# Patient Record
Sex: MALE | Race: BLACK OR AFRICAN AMERICAN | Employment: UNEMPLOYED | URBAN - METROPOLITAN AREA
[De-identification: names, ages, dates, MRNs, and addresses within clinical notes are randomized per-mention and may not be internally consistent; named-entity substitution may affect disease eponyms.]

---

## 2018-10-25 ENCOUNTER — HOSPITAL ENCOUNTER (EMERGENCY)
Age: 53
Discharge: HOME | End: 2018-10-25
Attending: EMERGENCY MEDICINE

## 2018-10-25 ENCOUNTER — APPOINTMENT (OUTPATIENT)
Dept: CT IMAGING | Age: 53
End: 2018-10-25

## 2018-10-25 VITALS
TEMPERATURE: 97.8 F | DIASTOLIC BLOOD PRESSURE: 87 MMHG | HEART RATE: 56 BPM | SYSTOLIC BLOOD PRESSURE: 164 MMHG | RESPIRATION RATE: 15 BRPM | OXYGEN SATURATION: 99 %

## 2018-10-25 DIAGNOSIS — G44.229 CHRONIC TENSION-TYPE HEADACHE, NOT INTRACTABLE: Primary | ICD-10-CM

## 2018-10-25 PROCEDURE — 99284 EMERGENCY DEPT VISIT MOD MDM: CPT

## 2018-10-25 PROCEDURE — 6370000000 HC RX 637 (ALT 250 FOR IP): Performed by: EMERGENCY MEDICINE

## 2018-10-25 PROCEDURE — 96375 TX/PRO/DX INJ NEW DRUG ADDON: CPT

## 2018-10-25 PROCEDURE — 70450 CT HEAD/BRAIN W/O DYE: CPT

## 2018-10-25 PROCEDURE — 6360000002 HC RX W HCPCS: Performed by: EMERGENCY MEDICINE

## 2018-10-25 PROCEDURE — 96374 THER/PROPH/DIAG INJ IV PUSH: CPT

## 2018-10-25 PROCEDURE — 2580000003 HC RX 258: Performed by: EMERGENCY MEDICINE

## 2018-10-25 PROCEDURE — 96361 HYDRATE IV INFUSION ADD-ON: CPT

## 2018-10-25 RX ORDER — 0.9 % SODIUM CHLORIDE 0.9 %
1000 INTRAVENOUS SOLUTION INTRAVENOUS ONCE
Status: COMPLETED | OUTPATIENT
Start: 2018-10-25 | End: 2018-10-25

## 2018-10-25 RX ORDER — HYDROCHLOROTHIAZIDE 25 MG/1
25 TABLET ORAL DAILY
Qty: 30 TABLET | Refills: 1 | Status: SHIPPED | OUTPATIENT
Start: 2018-10-25 | End: 2019-02-28

## 2018-10-25 RX ORDER — HYDROCHLOROTHIAZIDE 25 MG/1
25 TABLET ORAL ONCE
Status: COMPLETED | OUTPATIENT
Start: 2018-10-25 | End: 2018-10-25

## 2018-10-25 RX ORDER — DIPHENHYDRAMINE HYDROCHLORIDE 50 MG/ML
25 INJECTION INTRAMUSCULAR; INTRAVENOUS ONCE
Status: COMPLETED | OUTPATIENT
Start: 2018-10-25 | End: 2018-10-25

## 2018-10-25 RX ORDER — ACETAMINOPHEN 325 MG/1
650 TABLET ORAL ONCE
Status: COMPLETED | OUTPATIENT
Start: 2018-10-25 | End: 2018-10-25

## 2018-10-25 RX ADMIN — ACETAMINOPHEN 650 MG: 325 TABLET ORAL at 07:21

## 2018-10-25 RX ADMIN — PROCHLORPERAZINE EDISYLATE 10 MG: 5 INJECTION INTRAMUSCULAR; INTRAVENOUS at 07:21

## 2018-10-25 RX ADMIN — HYDROCHLOROTHIAZIDE 25 MG: 25 TABLET ORAL at 07:21

## 2018-10-25 RX ADMIN — DIPHENHYDRAMINE HYDROCHLORIDE 25 MG: 50 INJECTION INTRAMUSCULAR; INTRAVENOUS at 07:21

## 2018-10-25 RX ADMIN — SODIUM CHLORIDE 1000 ML: 9 INJECTION, SOLUTION INTRAVENOUS at 07:18

## 2018-10-25 ASSESSMENT — PAIN SCALES - GENERAL
PAINLEVEL_OUTOF10: 2
PAINLEVEL_OUTOF10: 10

## 2018-10-25 ASSESSMENT — PAIN DESCRIPTION - LOCATION: LOCATION: HEAD

## 2018-10-25 ASSESSMENT — PAIN DESCRIPTION - DESCRIPTORS: DESCRIPTORS: ACHING

## 2018-10-25 ASSESSMENT — PAIN DESCRIPTION - PAIN TYPE: TYPE: ACUTE PAIN

## 2018-10-25 NOTE — ED PROVIDER NOTES
he uses drugs, including Marijuana and Cocaine. Medications     Current Discharge Medication List      CONTINUE these medications which have NOT CHANGED    Details   tamsulosin (FLOMAX) 0.4 MG capsule Take 1 capsule by mouth daily  Qty: 30 capsule, Refills: 0             Allergies     He has No Known Allergies. Physical Exam     INITIAL VITALS: BP: (!) 196/129, Temp: 97.8 °F (36.6 °C), Pulse: 70, Resp: 24, SpO2: 98 %   Physical Exam  General: Well appearing in NAD  HEENT:  head is atraumatic, sclera are clear, oropharynx is nonerythematous, mucus membranes are moist  Neck: Trachea midline  Chest: Nonlabored respirations, clear to auscultation bilaterally  Cardiovascular: Regular rate and rhythm, 2+ radial pulses bilaterally  Abdominal: Nondistended abdomen, soft, nontender without rebound or gaurding  Skin: Warm, dry well perfused, no rashes  Extremities: no obvious deformities, no tenderness to palpation diffusely  Neurologic: Alert and oriented x4, speech is clear and intact without dysarthria, cranial nerves II-XII are intact, there is 5 out of 5 strength in bilateral upper and lower extremities. There is intact sensation to light touch in bilateral upper and lower extremities. PEERL. EOMI without vertical or horizontal nystagmus. Pupils equal round reactive to light, no nuchal rigidity. Psychologic: appropriate mood and affect  Diagnostic Results     EKG     RADIOLOGY:  CT Head WO Contrast   Final Result      Mild sinus disease. No acute intracranial abnormality. LABS:   No results found for this visit on 10/25/18. ED BEDSIDE ULTRASOUND:    RECENT VITALS:  BP: (!) 164/87,Temp: 97.8 °F (36.6 °C), Pulse: 56, Resp: 15, SpO2: 99 %     Procedures       ED Course     Nursing Notes, Past Medical Hx, Past Surgical Hx, Social Hx,Allergies, and Family Hx were reviewed.     The patient was given the following medications:  Orders Placed This Encounter   Medications    hydrochlorothiazide (HYDRODIURIL) tablet 25 mg    acetaminophen (TYLENOL) tablet 650 mg    prochlorperazine (COMPAZINE) injection 10 mg    diphenhydrAMINE (BENADRYL) injection 25 mg    0.9 % sodium chloride bolus    hydrochlorothiazide (HYDRODIURIL) 25 MG tablet     Sig: Take 1 tablet by mouth daily     Dispense:  30 tablet     Refill:  1       CONSULTS:  None    MEDICAL DECISIONMAKING / ASSESSMENT / PLAN     Fadumo Galicia is a 46 y.o. male who presents with headache. On presentation he was complaining of bitemporal frontal headache. Started 2 hours prior to arrival.  Similar in exact same character as previous headaches that she's presented here for. He has had normal CTA of head and neck without any evidence of aneurysm 8 months ago. CT head at that time was normal as well. He was hypertensive here to the 180s over 110s, but had not taken his blood pressure medication for many months. He did not nonfocal neurologic exam.  Head CT repeated here today well within 6 hours of onset of headache which was within normal limits. Given normal head CT. Headaches like this before low concern for subarachnoid hemorrhage and hit a normal CT a 6-7 months ago. He was given Compazine, Benadryl and Tylenol and had reduction of headache to 2 out of 10 blood pressure returned to lower levels at 164/87 felt much improved walking around the emergency Department in no acute distress with a nonfocal neurologic exam and was discharged in stable condition. Clinical Impression     1.  Chronic tension-type headache, not intractable        Disposition     PATIENT REFERRED TO:  [facility] Emergency Department  [address] [place] [address]  [phone]          DISCHARGE MEDICATIONS:  Current Discharge Medication List          DISPOSITION Decision To Discharge 10/25/2018 08:46:48 AM       Valerie Seay MD  10/25/18 2610

## 2019-02-28 ENCOUNTER — HOSPITAL ENCOUNTER (EMERGENCY)
Age: 54
Discharge: HOME | End: 2019-02-28
Attending: EMERGENCY MEDICINE

## 2019-02-28 ENCOUNTER — APPOINTMENT (OUTPATIENT)
Dept: CT IMAGING | Age: 54
End: 2019-02-28

## 2019-02-28 VITALS
SYSTOLIC BLOOD PRESSURE: 178 MMHG | RESPIRATION RATE: 14 BRPM | BODY MASS INDEX: 27.59 KG/M2 | OXYGEN SATURATION: 98 % | HEART RATE: 74 BPM | TEMPERATURE: 97.9 F | HEIGHT: 74 IN | WEIGHT: 215 LBS | DIASTOLIC BLOOD PRESSURE: 102 MMHG

## 2019-02-28 DIAGNOSIS — I10 HYPERTENSION, UNSPECIFIED TYPE: Primary | ICD-10-CM

## 2019-02-28 DIAGNOSIS — G44.209 ACUTE NON INTRACTABLE TENSION-TYPE HEADACHE: ICD-10-CM

## 2019-02-28 LAB
ANION GAP SERPL CALCULATED.3IONS-SCNC: 18 MMOL/L (ref 3–16)
BUN BLDV-MCNC: 12 MG/DL (ref 7–20)
CALCIUM SERPL-MCNC: 9.5 MG/DL (ref 8.3–10.6)
CHLORIDE BLD-SCNC: 102 MMOL/L (ref 99–110)
CO2: 24 MMOL/L (ref 21–32)
CREAT SERPL-MCNC: 0.9 MG/DL (ref 0.9–1.3)
GFR AFRICAN AMERICAN: >60
GFR NON-AFRICAN AMERICAN: >60
GLUCOSE BLD-MCNC: 187 MG/DL (ref 70–99)
HCT VFR BLD CALC: 44.6 % (ref 40.5–52.5)
HEMOGLOBIN: 15 G/DL (ref 13.5–17.5)
MCH RBC QN AUTO: 31.9 PG (ref 26–34)
MCHC RBC AUTO-ENTMCNC: 33.6 G/DL (ref 31–36)
MCV RBC AUTO: 94.8 FL (ref 80–100)
PDW BLD-RTO: 12.8 % (ref 12.4–15.4)
PLATELET # BLD: 322 K/UL (ref 135–450)
PMV BLD AUTO: 7.7 FL (ref 5–10.5)
POTASSIUM REFLEX MAGNESIUM: 4 MMOL/L (ref 3.5–5.1)
RBC # BLD: 4.71 M/UL (ref 4.2–5.9)
SODIUM BLD-SCNC: 144 MMOL/L (ref 136–145)
TROPONIN: <0.01 NG/ML
WBC # BLD: 6.2 K/UL (ref 4–11)

## 2019-02-28 PROCEDURE — 96374 THER/PROPH/DIAG INJ IV PUSH: CPT

## 2019-02-28 PROCEDURE — 6360000002 HC RX W HCPCS: Performed by: STUDENT IN AN ORGANIZED HEALTH CARE EDUCATION/TRAINING PROGRAM

## 2019-02-28 PROCEDURE — 6370000000 HC RX 637 (ALT 250 FOR IP): Performed by: STUDENT IN AN ORGANIZED HEALTH CARE EDUCATION/TRAINING PROGRAM

## 2019-02-28 PROCEDURE — 2580000003 HC RX 258: Performed by: STUDENT IN AN ORGANIZED HEALTH CARE EDUCATION/TRAINING PROGRAM

## 2019-02-28 PROCEDURE — 80048 BASIC METABOLIC PNL TOTAL CA: CPT

## 2019-02-28 PROCEDURE — 70450 CT HEAD/BRAIN W/O DYE: CPT

## 2019-02-28 PROCEDURE — 99284 EMERGENCY DEPT VISIT MOD MDM: CPT

## 2019-02-28 PROCEDURE — 84484 ASSAY OF TROPONIN QUANT: CPT

## 2019-02-28 PROCEDURE — 96361 HYDRATE IV INFUSION ADD-ON: CPT

## 2019-02-28 PROCEDURE — 85027 COMPLETE CBC AUTOMATED: CPT

## 2019-02-28 PROCEDURE — 93005 ELECTROCARDIOGRAM TRACING: CPT | Performed by: STUDENT IN AN ORGANIZED HEALTH CARE EDUCATION/TRAINING PROGRAM

## 2019-02-28 RX ORDER — DIPHENHYDRAMINE HYDROCHLORIDE 50 MG/ML
25 INJECTION INTRAMUSCULAR; INTRAVENOUS ONCE
Status: DISCONTINUED | OUTPATIENT
Start: 2019-02-28 | End: 2019-02-28 | Stop reason: HOSPADM

## 2019-02-28 RX ORDER — HYDROCHLOROTHIAZIDE 25 MG/1
25 TABLET ORAL ONCE
Status: COMPLETED | OUTPATIENT
Start: 2019-02-28 | End: 2019-02-28

## 2019-02-28 RX ORDER — ACETAMINOPHEN 500 MG
1000 TABLET ORAL ONCE
Status: COMPLETED | OUTPATIENT
Start: 2019-02-28 | End: 2019-02-28

## 2019-02-28 RX ORDER — SODIUM CHLORIDE, SODIUM LACTATE, POTASSIUM CHLORIDE, CALCIUM CHLORIDE 600; 310; 30; 20 MG/100ML; MG/100ML; MG/100ML; MG/100ML
1000 INJECTION, SOLUTION INTRAVENOUS ONCE
Status: COMPLETED | OUTPATIENT
Start: 2019-02-28 | End: 2019-02-28

## 2019-02-28 RX ORDER — HYDROCHLOROTHIAZIDE 25 MG/1
25 TABLET ORAL DAILY
Qty: 30 TABLET | Refills: 1 | Status: ON HOLD | OUTPATIENT
Start: 2019-02-28 | End: 2022-07-27

## 2019-02-28 RX ADMIN — HYDROCHLOROTHIAZIDE 25 MG: 25 TABLET ORAL at 17:52

## 2019-02-28 RX ADMIN — PROCHLORPERAZINE EDISYLATE 10 MG: 5 INJECTION INTRAMUSCULAR; INTRAVENOUS at 18:34

## 2019-02-28 RX ADMIN — ACETAMINOPHEN 1000 MG: 500 TABLET, FILM COATED ORAL at 17:52

## 2019-02-28 RX ADMIN — SODIUM CHLORIDE, POTASSIUM CHLORIDE, SODIUM LACTATE AND CALCIUM CHLORIDE 1000 ML: 600; 310; 30; 20 INJECTION, SOLUTION INTRAVENOUS at 18:34

## 2019-02-28 ASSESSMENT — ENCOUNTER SYMPTOMS
RHINORRHEA: 0
EYE DISCHARGE: 0
SHORTNESS OF BREATH: 0
CHEST TIGHTNESS: 0
NAUSEA: 0
SORE THROAT: 0
ABDOMINAL PAIN: 0
WHEEZING: 0
EYE ITCHING: 0
CONSTIPATION: 0
VOMITING: 0
DIARRHEA: 0
COUGH: 0
CHOKING: 0

## 2019-02-28 ASSESSMENT — PAIN DESCRIPTION - PAIN TYPE: TYPE: ACUTE PAIN

## 2019-02-28 ASSESSMENT — PAIN DESCRIPTION - LOCATION: LOCATION: HEAD

## 2019-02-28 ASSESSMENT — PAIN SCALES - GENERAL: PAINLEVEL_OUTOF10: 10

## 2019-03-01 LAB
EKG ATRIAL RATE: 71 BPM
EKG DIAGNOSIS: NORMAL
EKG P AXIS: 78 DEGREES
EKG P-R INTERVAL: 140 MS
EKG Q-T INTERVAL: 378 MS
EKG QRS DURATION: 94 MS
EKG QTC CALCULATION (BAZETT): 410 MS
EKG R AXIS: 26 DEGREES
EKG T AXIS: -14 DEGREES
EKG VENTRICULAR RATE: 71 BPM

## 2022-07-27 ENCOUNTER — HOSPITAL ENCOUNTER (OUTPATIENT)
Age: 57
Setting detail: OBSERVATION
Discharge: HOME | End: 2022-07-28
Attending: EMERGENCY MEDICINE | Admitting: INTERNAL MEDICINE
Payer: COMMERCIAL

## 2022-07-27 ENCOUNTER — APPOINTMENT (OUTPATIENT)
Dept: GENERAL RADIOLOGY | Age: 57
End: 2022-07-27
Payer: COMMERCIAL

## 2022-07-27 DIAGNOSIS — R07.9 CHEST PAIN, UNSPECIFIED TYPE: Primary | ICD-10-CM

## 2022-07-27 LAB
AMPHETAMINE SCREEN, URINE: ABNORMAL
ANION GAP SERPL CALCULATED.3IONS-SCNC: 9 MMOL/L (ref 3–16)
BARBITURATE SCREEN URINE: ABNORMAL
BASOPHILS ABSOLUTE: 0.1 K/UL (ref 0–0.2)
BASOPHILS RELATIVE PERCENT: 1.3 %
BENZODIAZEPINE SCREEN, URINE: ABNORMAL
BUN BLDV-MCNC: 21 MG/DL (ref 7–20)
CALCIUM SERPL-MCNC: 9.5 MG/DL (ref 8.3–10.6)
CANNABINOID SCREEN URINE: ABNORMAL
CHLORIDE BLD-SCNC: 99 MMOL/L (ref 99–110)
CO2: 24 MMOL/L (ref 21–32)
COCAINE METABOLITE SCREEN URINE: POSITIVE
CREAT SERPL-MCNC: 1.1 MG/DL (ref 0.9–1.3)
EKG ATRIAL RATE: 79 BPM
EKG DIAGNOSIS: NORMAL
EKG P AXIS: 77 DEGREES
EKG P-R INTERVAL: 152 MS
EKG Q-T INTERVAL: 386 MS
EKG QRS DURATION: 92 MS
EKG QTC CALCULATION (BAZETT): 442 MS
EKG R AXIS: 44 DEGREES
EKG T AXIS: -61 DEGREES
EKG VENTRICULAR RATE: 79 BPM
EOSINOPHILS ABSOLUTE: 0.1 K/UL (ref 0–0.6)
EOSINOPHILS RELATIVE PERCENT: 1.5 %
GFR AFRICAN AMERICAN: >60
GFR NON-AFRICAN AMERICAN: >60
GLUCOSE BLD-MCNC: 134 MG/DL (ref 70–99)
HCT VFR BLD CALC: 38.9 % (ref 40.5–52.5)
HEMOGLOBIN: 13.5 G/DL (ref 13.5–17.5)
LYMPHOCYTES ABSOLUTE: 2.1 K/UL (ref 1–5.1)
LYMPHOCYTES RELATIVE PERCENT: 28 %
Lab: ABNORMAL
MCH RBC QN AUTO: 32.2 PG (ref 26–34)
MCHC RBC AUTO-ENTMCNC: 34.7 G/DL (ref 31–36)
MCV RBC AUTO: 92.7 FL (ref 80–100)
METHADONE SCREEN, URINE: ABNORMAL
MONOCYTES ABSOLUTE: 0.5 K/UL (ref 0–1.3)
MONOCYTES RELATIVE PERCENT: 6.9 %
NEUTROPHILS ABSOLUTE: 4.7 K/UL (ref 1.7–7.7)
NEUTROPHILS RELATIVE PERCENT: 62.3 %
OPIATE SCREEN URINE: ABNORMAL
OXYCODONE URINE: ABNORMAL
PDW BLD-RTO: 12.5 % (ref 12.4–15.4)
PH UA: 4
PHENCYCLIDINE SCREEN URINE: ABNORMAL
PLATELET # BLD: 290 K/UL (ref 135–450)
PMV BLD AUTO: 7.5 FL (ref 5–10.5)
POTASSIUM SERPL-SCNC: 3.6 MMOL/L (ref 3.5–5.1)
PRO-BNP: 50 PG/ML (ref 0–124)
PROPOXYPHENE SCREEN: ABNORMAL
RBC # BLD: 4.19 M/UL (ref 4.2–5.9)
SODIUM BLD-SCNC: 132 MMOL/L (ref 136–145)
TROPONIN: <0.01 NG/ML
TROPONIN: <0.01 NG/ML
WBC # BLD: 7.6 K/UL (ref 4–11)

## 2022-07-27 PROCEDURE — 80048 BASIC METABOLIC PNL TOTAL CA: CPT

## 2022-07-27 PROCEDURE — G0378 HOSPITAL OBSERVATION PER HR: HCPCS

## 2022-07-27 PROCEDURE — 2580000003 HC RX 258: Performed by: INTERNAL MEDICINE

## 2022-07-27 PROCEDURE — 6360000002 HC RX W HCPCS: Performed by: INTERNAL MEDICINE

## 2022-07-27 PROCEDURE — 93005 ELECTROCARDIOGRAM TRACING: CPT | Performed by: EMERGENCY MEDICINE

## 2022-07-27 PROCEDURE — 99205 OFFICE O/P NEW HI 60 MIN: CPT | Performed by: INTERNAL MEDICINE

## 2022-07-27 PROCEDURE — 99285 EMERGENCY DEPT VISIT HI MDM: CPT

## 2022-07-27 PROCEDURE — 71046 X-RAY EXAM CHEST 2 VIEWS: CPT

## 2022-07-27 PROCEDURE — 6370000000 HC RX 637 (ALT 250 FOR IP): Performed by: INTERNAL MEDICINE

## 2022-07-27 PROCEDURE — 6370000000 HC RX 637 (ALT 250 FOR IP): Performed by: PHYSICIAN ASSISTANT

## 2022-07-27 PROCEDURE — 80307 DRUG TEST PRSMV CHEM ANLYZR: CPT

## 2022-07-27 PROCEDURE — 83880 ASSAY OF NATRIURETIC PEPTIDE: CPT

## 2022-07-27 PROCEDURE — 96372 THER/PROPH/DIAG INJ SC/IM: CPT

## 2022-07-27 PROCEDURE — 36415 COLL VENOUS BLD VENIPUNCTURE: CPT

## 2022-07-27 PROCEDURE — 83036 HEMOGLOBIN GLYCOSYLATED A1C: CPT

## 2022-07-27 PROCEDURE — 84484 ASSAY OF TROPONIN QUANT: CPT

## 2022-07-27 PROCEDURE — 85025 COMPLETE CBC W/AUTO DIFF WBC: CPT

## 2022-07-27 RX ORDER — AMLODIPINE BESYLATE 10 MG/1
10 TABLET ORAL DAILY
Status: DISCONTINUED | OUTPATIENT
Start: 2022-07-27 | End: 2022-07-28 | Stop reason: HOSPADM

## 2022-07-27 RX ORDER — ONDANSETRON 2 MG/ML
4 INJECTION INTRAMUSCULAR; INTRAVENOUS EVERY 6 HOURS PRN
Status: DISCONTINUED | OUTPATIENT
Start: 2022-07-27 | End: 2022-07-28 | Stop reason: HOSPADM

## 2022-07-27 RX ORDER — CARVEDILOL 6.25 MG/1
3.12 TABLET ORAL 2 TIMES DAILY WITH MEALS
Status: DISCONTINUED | OUTPATIENT
Start: 2022-07-27 | End: 2022-07-27

## 2022-07-27 RX ORDER — SODIUM CHLORIDE 0.9 % (FLUSH) 0.9 %
5-40 SYRINGE (ML) INJECTION EVERY 12 HOURS SCHEDULED
Status: DISCONTINUED | OUTPATIENT
Start: 2022-07-27 | End: 2022-07-28 | Stop reason: HOSPADM

## 2022-07-27 RX ORDER — ATORVASTATIN CALCIUM 40 MG/1
40 TABLET, FILM COATED ORAL NIGHTLY
Status: DISCONTINUED | OUTPATIENT
Start: 2022-07-27 | End: 2022-07-28 | Stop reason: HOSPADM

## 2022-07-27 RX ORDER — ONDANSETRON 4 MG/1
4 TABLET, ORALLY DISINTEGRATING ORAL EVERY 8 HOURS PRN
Status: DISCONTINUED | OUTPATIENT
Start: 2022-07-27 | End: 2022-07-28 | Stop reason: HOSPADM

## 2022-07-27 RX ORDER — ENOXAPARIN SODIUM 100 MG/ML
40 INJECTION SUBCUTANEOUS DAILY
Status: DISCONTINUED | OUTPATIENT
Start: 2022-07-27 | End: 2022-07-27

## 2022-07-27 RX ORDER — ENOXAPARIN SODIUM 150 MG/ML
1 INJECTION SUBCUTANEOUS DAILY
Status: COMPLETED | OUTPATIENT
Start: 2022-07-27 | End: 2022-07-27

## 2022-07-27 RX ORDER — POLYETHYLENE GLYCOL 3350 17 G/17G
17 POWDER, FOR SOLUTION ORAL DAILY PRN
Status: DISCONTINUED | OUTPATIENT
Start: 2022-07-27 | End: 2022-07-28 | Stop reason: HOSPADM

## 2022-07-27 RX ORDER — SODIUM CHLORIDE 9 MG/ML
INJECTION, SOLUTION INTRAVENOUS PRN
Status: DISCONTINUED | OUTPATIENT
Start: 2022-07-27 | End: 2022-07-28 | Stop reason: HOSPADM

## 2022-07-27 RX ORDER — NITROGLYCERIN 0.4 MG/1
0.4 TABLET SUBLINGUAL EVERY 5 MIN PRN
Status: DISCONTINUED | OUTPATIENT
Start: 2022-07-27 | End: 2022-07-28 | Stop reason: HOSPADM

## 2022-07-27 RX ORDER — ACETAMINOPHEN 325 MG/1
650 TABLET ORAL EVERY 6 HOURS PRN
Status: DISCONTINUED | OUTPATIENT
Start: 2022-07-27 | End: 2022-07-28 | Stop reason: HOSPADM

## 2022-07-27 RX ORDER — TAMSULOSIN HYDROCHLORIDE 0.4 MG/1
0.4 CAPSULE ORAL DAILY
Status: DISCONTINUED | OUTPATIENT
Start: 2022-07-27 | End: 2022-07-28 | Stop reason: HOSPADM

## 2022-07-27 RX ORDER — ACETAMINOPHEN 650 MG/1
650 SUPPOSITORY RECTAL EVERY 6 HOURS PRN
Status: DISCONTINUED | OUTPATIENT
Start: 2022-07-27 | End: 2022-07-28 | Stop reason: HOSPADM

## 2022-07-27 RX ORDER — ASPIRIN 81 MG/1
81 TABLET, CHEWABLE ORAL DAILY
Status: DISCONTINUED | OUTPATIENT
Start: 2022-07-28 | End: 2022-07-28 | Stop reason: HOSPADM

## 2022-07-27 RX ORDER — ASPIRIN 325 MG
325 TABLET ORAL ONCE
Status: CANCELLED | OUTPATIENT
Start: 2022-07-27 | End: 2022-07-27

## 2022-07-27 RX ORDER — AMLODIPINE BESYLATE 10 MG/1
10 TABLET ORAL DAILY
COMMUNITY
Start: 2022-06-20

## 2022-07-27 RX ORDER — SODIUM CHLORIDE 0.9 % (FLUSH) 0.9 %
5-40 SYRINGE (ML) INJECTION PRN
Status: DISCONTINUED | OUTPATIENT
Start: 2022-07-27 | End: 2022-07-28 | Stop reason: HOSPADM

## 2022-07-27 RX ADMIN — ENOXAPARIN SODIUM 105 MG: 150 INJECTION SUBCUTANEOUS at 18:24

## 2022-07-27 RX ADMIN — AMLODIPINE BESYLATE 10 MG: 10 TABLET ORAL at 22:15

## 2022-07-27 RX ADMIN — NITROGLYCERIN 0.4 MG: 0.4 TABLET, ORALLY DISINTEGRATING SUBLINGUAL at 12:41

## 2022-07-27 RX ADMIN — ATORVASTATIN CALCIUM 40 MG: 40 TABLET, FILM COATED ORAL at 20:29

## 2022-07-27 RX ADMIN — SODIUM CHLORIDE, PRESERVATIVE FREE 10 ML: 5 INJECTION INTRAVENOUS at 20:30

## 2022-07-27 RX ADMIN — TAMSULOSIN HYDROCHLORIDE 0.4 MG: 0.4 CAPSULE ORAL at 20:29

## 2022-07-27 ASSESSMENT — PAIN - FUNCTIONAL ASSESSMENT
PAIN_FUNCTIONAL_ASSESSMENT: NONE - DENIES PAIN
PAIN_FUNCTIONAL_ASSESSMENT: 0-10

## 2022-07-27 ASSESSMENT — ENCOUNTER SYMPTOMS
VOMITING: 0
NAUSEA: 1
ABDOMINAL PAIN: 0
SHORTNESS OF BREATH: 1
EYE REDNESS: 0

## 2022-07-27 ASSESSMENT — PAIN DESCRIPTION - LOCATION
LOCATION: CHEST
LOCATION: CHEST

## 2022-07-27 ASSESSMENT — PAIN DESCRIPTION - DESCRIPTORS
DESCRIPTORS: SHOOTING;SHARP
DESCRIPTORS: ACHING

## 2022-07-27 ASSESSMENT — HEART SCORE
ECG: 1
ECG: 1

## 2022-07-27 ASSESSMENT — PAIN SCALES - GENERAL
PAINLEVEL_OUTOF10: 5
PAINLEVEL_OUTOF10: 5

## 2022-07-27 ASSESSMENT — PAIN DESCRIPTION - ORIENTATION
ORIENTATION: LEFT
ORIENTATION: LEFT

## 2022-07-27 NOTE — ED NOTES
Cardiology consult at bs. Pt agreeable at this time.  Lunch box given per request.      Yessi Zamora RN  07/27/22 Jamshid Pradhan RN  07/27/22 [id number]

## 2022-07-27 NOTE — ED PROVIDER NOTES
[facility]          PHYSICIAN ASSISTANT NOTE       Date of evaluation: 7/27/2022    Chief Complaint     Chest Pain (C/o chest pain was standing at work and felt a sharp pain on the left side of his chest.)      History of Present Illness     Alberto Zhou is a 61 y.o. male with PMH of HTN, Tobacco Use (1PPD x 40 yrs), Cocaine use (last use 2 days ago) who presents with Chest pain. Patient reports onset of nonradiating sharp left-sided chest discomfort approximately 1 hour prior to arrival while he was smoking at work. 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 He states he was also feeling short of breath and somewhat dizzy. EMS were called. Patient was given aspirin and nitro en route with improvement. He now rates his discomfort 5/10. He denies associated diaphoresis, numbness or tingling, nausea or vomiting, abdominal pain, leg swelling or pain. His last stress test was several years ago. Review of Systems     Review of Systems   Constitutional:  Negative for chills, diaphoresis and fever. HENT:  Negative for congestion. Eyes:  Negative for redness. Respiratory:  Positive for shortness of breath. Cardiovascular:  Positive for chest pain. Gastrointestinal:  Positive for nausea (few days ago). Negative for abdominal pain and vomiting. Genitourinary:  Negative for difficulty urinating. Musculoskeletal:  Negative for gait problem. Skin:  Negative for wound. Neurological:  Positive for dizziness. Negative for weakness and numbness. Psychiatric/Behavioral:  The patient is not nervous/anxious. Past Medical, Surgical, Family, and Social History     He has a past medical history of Drug abuse ([address] Headache, History of gout, Hypertension, and Normal cardiac stress test.  He has a past surgical history that includes Cruciate/collateral ligament repair and Wrist surgery.   His family history includes Heart Disease in his father and mother; High Blood Pressure in his father and mother. He reports that he has been smoking cigarettes. He has a 30.00 pack-year smoking history. He has never used smokeless tobacco. He reports current alcohol use. He reports current drug use. Drugs: Marijuana (Weed) and Cocaine. Medications     Previous Medications    AMLODIPINE (NORVASC) 10 MG TABLET    Take 10 mg by mouth in the morning. HYDROCHLOROTHIAZIDE (HYDRODIURIL) 25 MG TABLET    Take 1 tablet by mouth daily    TAMSULOSIN (FLOMAX) 0.4 MG CAPSULE    Take 1 capsule by mouth daily       Allergies     He has No Known Allergies. Physical Exam     INITIAL VITALS: BP: (!) 142/97,Temp: 98 °F (36.7 °C), Heart Rate: 77, Resp: 18, SpO2: 99 %   Physical Exam  Vitals and nursing note reviewed. Constitutional:       General: He is not in acute distress. HENT:      Head: Normocephalic and atraumatic. Mouth/Throat:      Mouth: Mucous membranes are moist.   Eyes:      Extraocular Movements: Extraocular movements intact. Conjunctiva/sclera: Conjunctivae normal.   Cardiovascular:      Rate and Rhythm: Normal rate and regular rhythm. Pulmonary:      Effort: Pulmonary effort is normal. No respiratory distress. Breath sounds: Normal breath sounds. No wheezing, rhonchi or rales. Abdominal:      General: Bowel sounds are normal. There is no distension. Palpations: Abdomen is soft. Tenderness: There is no abdominal tenderness. There is no guarding or rebound. Musculoskeletal:         General: No tenderness (no calf tenderness). Cervical back: Neck supple. Right lower leg: No edema. Left lower leg: No edema. Skin:     General: Skin is warm and dry. Neurological:      Mental Status: He is alert and oriented to person, place, and time.    Psychiatric:         Mood and Affect: Mood normal.         Behavior: Behavior normal.       Diagnostic Results     EKG   Interpreted in conjunction with emergencydepartment physician Arden Parmar MD  Rhythm: normal sinus   Rate: normal  Axis: normal  Ectopy: none  Conduction: normal  ST Segments: no acute change  T Waves:inversion in  v4, v5, v6, II, III, and aVf  Q Waves: none  Clinical Impression: NSR with new lateral T wave inversions  Comparison:  2/28/2019    RADIOLOGY:  XR CHEST (2 VW)   Final Result      Suspected bibasilar atelectasis                      LABS:   Results for orders placed or performed during the hospital encounter of 07/27/22   BMP   Result Value Ref Range    Sodium 132 (L) 136 - 145 mmol/L    Potassium 3.6 3.5 - 5.1 mmol/L    Chloride 99 99 - 110 mmol/L    CO2 24 21 - 32 mmol/L    Anion Gap 9 3 - 16    Glucose 134 (H) 70 - 99 mg/dL    BUN 21 (H) 7 - 20 mg/dL    Creatinine 1.1 0.9 - 1.3 mg/dL    GFR Non-African American >60 >60    GFR African American >60 >60    Calcium 9.5 8.3 - 10.6 mg/dL   CBC with Auto Differential   Result Value Ref Range    WBC 7.6 4.0 - 11.0 K/uL    RBC 4.19 (L) 4.20 - 5.90 M/uL    Hemoglobin 13.5 13.5 - 17.5 g/dL    Hematocrit 38.9 (L) 40.5 - 52.5 %    MCV 92.7 80.0 - 100.0 fL    MCH 32.2 26.0 - 34.0 pg    MCHC 34.7 31.0 - 36.0 g/dL    RDW 12.5 12.4 - 15.4 %    Platelets 798 785 - 611 K/uL    MPV 7.5 5.0 - 10.5 fL    Neutrophils % 62.3 %    Lymphocytes % 28.0 %    Monocytes % 6.9 %    Eosinophils % 1.5 %    Basophils % 1.3 %    Neutrophils Absolute 4.7 1.7 - 7.7 K/uL    Lymphocytes Absolute 2.1 1.0 - 5.1 K/uL    Monocytes Absolute 0.5 0.0 - 1.3 K/uL    Eosinophils Absolute 0.1 0.0 - 0.6 K/uL    Basophils Absolute 0.1 0.0 - 0.2 K/uL   Troponin   Result Value Ref Range    Troponin <0.01 <0.01 ng/mL   Brain Natriuretic Peptide   Result Value Ref Range    Pro-BNP 50 0 - 124 pg/mL   EKG 12 Lead   Result Value Ref Range    Ventricular Rate 79 BPM    Atrial Rate 79 BPM    P-R Interval 152 ms    QRS Duration 92 ms    Q-T Interval 386 ms    QTc Calculation (Bazett) 442 ms    P Axis 77 degrees    R Axis 44 degrees    T Axis -61 degrees    Diagnosis       EKG performed in ER and to be interpreted by ER physician. Confirmed by MD, ER ([id number]),  Patty Babcock ([id number]) on 7/27/2022 1:30:52 PM       RECENT VITALS:  BP: (!) 143/91, Temp: 98 °F (36.7 °C), Heart Rate: 72,Resp: 16, SpO2: 100 %     Procedures         ED Course     Nursing Notes, Past Medical Hx, Past Surgical Hx, Social Hx, Allergies, and Family Hx were reviewed. The patient was given the followingmedications:  Orders Placed This Encounter   Medications    nitroGLYCERIN (NITROSTAT) SL tablet 0.4 mg       CONSULTS:  IP CONSULT TO HOSPITALIST    MEDICAL DECISION MAKING / ASSESSMENT / Don Scot is a 61 y.o. male with PMH of HTN, Tobacco Use (1PPD x 40 yrs), Cocaine use (last use 2 days ago) who presents with sharp left-sided chest pain without radiation that began while at work. The chest pain is nonexertional.  It was associated with shortness of breath and lightheadedness. Exam revealed a well-appearing 13-year-old male in no acute distress. Normal vitals. Heart rhythm regular. Lungs clear auscultation. No peripheral edema. EKG was remarkable for new lateral T wave inversions from prior EKG. Chest xray revealed suspected bibasilar atelectasis. Laboratory workup remarkable for negative troponin, BNP 50, glucose 134, creatinine 1.1. Patient has a heart score of 4 (given EKG changes, age, risk factors). It has been several years since his last stress test. His story is concerning for possible ACS. Will plan to admit for further cardiac risk stratification. I spoke with the admitting team who is in agreement with plan for admission. Patient and family are in agreement with plan for admission. Patient will be cared for in the ED prior to a bed becoming available upstairs. This patient was also evaluated by the attending physician. All care plans were discussed and agreed upon. Clinical Impression     1.  Chest pain, unspecified type Disposition     DISPOSITION Decision To Admit 07/27/2022 01:19:59 PM        Evangelina Quezada PA-C  07/27/22 1522

## 2022-07-27 NOTE — LETTER
[address] [facility] [address]  Phone: [phone]             July 28, 2022    Patient: Tor Louis   YOB: 1965   Date of Visit: 7/27/2022       To Whom It May Concern:    Tor Louis was seen and treated in our facility  beginning 7/27/2022 until 07/28/2022 . He may return to work 08/03/2022.       Sincerely,       Briana Bartlett RN         Signature:__________________________________

## 2022-07-27 NOTE — ED NOTES
Pt voicing that he needs to go home because he has to go to work. Team notified. Awaiting response. Pt aware.       Etelvina Sanon RN  07/27/22 1255

## 2022-07-27 NOTE — ED PROVIDER NOTES
ED Attending Attestation Note     Date of evaluation: 7/27/2022    This patient was seen by the advance practice provider. I have seen and examined the patient, agree with the workup, evaluation, management and diagnosis. The care plan has been discussed. I have reviewed the ECG and concur with the LUIS DANIEL's interpretation. My assessment reveals a 67-year-old male who presents with chief complaint of chest pain. Patient states he had onset of pain today while he was at work. 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  States he was smoking when the pain started. Patient with history of hypertension, long history of smoking. Pain was relieved with aspirin and nitroglycerin. Patient on reassessment states he still has a little pain but overall feels better. Normal heart and lungs. Charla Oquendo MD  07/27/22 3429

## 2022-07-27 NOTE — PROGRESS NOTES
Patient arrived to room 6327 from ED. Vitals stable, A/Ox4. Ambulates independently with very steady gait. Patient expressed wanting to go outside and. This RN explained to him that he isn't allowed off the unit to smoke and offered to message the MD about a nicotine patch. Patient refused stating he didn't want that and verbalized understanding that he couldn't leave the unit. Skin assessed, no issues. Placed on tele. Call light in reach.

## 2022-07-27 NOTE — H&P
History and Physical    Admit Date: 7/27/2022    Patient's PCP: Dr. Jessica Rivera primary care provider on file. Chief complaint: chest pain        HISTORY OF PRESENT ILLNESS:    This is a very pleasant 61 y.o. male with HTN, Tobacco Use (1PPD x 40 yrs), Cocaine abuse who presented to the ED with Chest pain. 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 He denies associated diaphoresis, numbness or tingling, nausea or vomiting, abdominal pain. He has not had  leg swelling or pain. He denies any history of CAD. Does report a family history of CAD in mother, who had a stent about his age. Ischemia eval in 2017: negative, no reversible ischemia. EF 74%. He called EMS with persistent symptoms, and was given aspirin and nitro en route per EMS, with improvement of his symptoms. In the ED, he was afebrile. SBP was in the 140s. Labs showed Cr 1.1, Na 132, and other labs were fairly normal. Troponin < 0.01. CXR showed nil acute, \"Suspected bibasilar atelectasis\". EKG showed TWI in the inferolateral leads which appear new c/f EKG from 2019. Past Medical / Surgical History:    Past Medical History:   Diagnosis Date    Drug abuse ([address]     sometimes snorts cocaine per pt, and use pot    Headache     History of gout     Hypertension     Normal cardiac stress test 02/2017       Past Surgical History:   Procedure Laterality Date    CRUCIATE AND COLLATERAL LIGAMENT REPAIR      WRIST SURGERY         Medications Prior to Admission:    No current facility-administered medications on file prior to encounter. Current Outpatient Medications on File Prior to Encounter   Medication Sig Dispense Refill    amLODIPine (NORVASC) 10 MG tablet Take 10 mg by mouth in the morning.       hydrochlorothiazide (HYDRODIURIL) 25 MG tablet Take 1 tablet by mouth daily 30 tablet 1    tamsulosin (FLOMAX) 0.4 MG capsule Take 1 capsule by mouth daily 30 capsule 0       Allergies:  Patient has no known allergies. Social History:   TOBACCO:   reports that he has been smoking cigarettes. He has a 30.00 pack-year smoking history. He has never used smokeless tobacco.     ETOH:   reports current alcohol use. Family History:       Problem Relation Age of Onset    High Blood Pressure Mother     Heart Disease Mother     Heart Disease Father     High Blood Pressure Father        ROS: Review of Systems - Negative except as in HPI. All other systems reviewed and are negative. All other systems reviewed and are negative. PHYSICAL EXAM:  BP (!) 143/91   Pulse 72   Temp 98 °F (36.7 °C) (Oral)   Resp 16   Ht 6' 2\" (1.88 m)   Wt 233 lb 4 oz (105.8 kg)   SpO2 100%   BMI 29.95 kg/m²     No results for input(s): POCGLU in the last 72 hours.     General appearance: alert, appears stated age, and cooperative  Head: Normocephalic, without obvious abnormality, atraumatic  Neck: no adenopathy, no carotid bruit, no JVD, supple, symmetrical, trachea midline, and thyroid not enlarged, symmetric, no tenderness/mass/nodules  Lungs: clear to auscultation bilaterally  Chest wall: no tenderness  Heart: regular rate and rhythm, S1, S2 normal, no murmur, click, rub or gallop  Abdomen: soft, non-tender; bowel sounds normal; no masses,  no organomegaly  Extremities: extremities normal, atraumatic, no cyanosis or edema  Pulses: 2+ and symmetric  Skin: Skin color, texture, turgor normal. No rashes or lesions  Neurologic: Grossly normal      LABS:  Recent Labs     07/27/22  1214   WBC 7.6   HGB 13.5   HCT 38.9*                                                                     Recent Labs     07/27/22  1214   *   K 3.6   CL 99   CO2 24   BUN 21*   CREATININE 1.1   GLUCOSE 134*     No results for input(s): AST, ALT, ALB, BILITOT, ALKPHOS in the last 72

## 2022-07-28 ENCOUNTER — APPOINTMENT (OUTPATIENT)
Dept: CARDIAC CATH/INVASIVE PROCEDURES | Age: 57
End: 2022-07-28
Payer: COMMERCIAL

## 2022-07-28 VITALS
HEIGHT: 74 IN | SYSTOLIC BLOOD PRESSURE: 124 MMHG | HEART RATE: 66 BPM | OXYGEN SATURATION: 98 % | BODY MASS INDEX: 29.4 KG/M2 | WEIGHT: 229.1 LBS | DIASTOLIC BLOOD PRESSURE: 78 MMHG | RESPIRATION RATE: 16 BRPM | TEMPERATURE: 98 F

## 2022-07-28 LAB
ANION GAP SERPL CALCULATED.3IONS-SCNC: 14 MMOL/L (ref 3–16)
BUN BLDV-MCNC: 16 MG/DL (ref 7–20)
CALCIUM SERPL-MCNC: 9.1 MG/DL (ref 8.3–10.6)
CHLORIDE BLD-SCNC: 105 MMOL/L (ref 99–110)
CHOLESTEROL, TOTAL: 148 MG/DL (ref 0–199)
CO2: 20 MMOL/L (ref 21–32)
CREAT SERPL-MCNC: 1 MG/DL (ref 0.9–1.3)
EKG ATRIAL RATE: 67 BPM
EKG DIAGNOSIS: NORMAL
EKG P AXIS: 73 DEGREES
EKG P-R INTERVAL: 150 MS
EKG Q-T INTERVAL: 424 MS
EKG QRS DURATION: 84 MS
EKG QTC CALCULATION (BAZETT): 448 MS
EKG R AXIS: 39 DEGREES
EKG T AXIS: -46 DEGREES
EKG VENTRICULAR RATE: 67 BPM
ESTIMATED AVERAGE GLUCOSE: 91.1 MG/DL
GFR AFRICAN AMERICAN: >60
GFR NON-AFRICAN AMERICAN: >60
GLUCOSE BLD-MCNC: 98 MG/DL (ref 70–99)
HBA1C MFR BLD: 4.8 %
HCT VFR BLD CALC: 41.4 % (ref 40.5–52.5)
HDLC SERPL-MCNC: 50 MG/DL (ref 40–60)
HEMOGLOBIN: 13.8 G/DL (ref 13.5–17.5)
INR BLD: 1.05 (ref 0.87–1.14)
LDL CHOLESTEROL CALCULATED: 55 MG/DL
LV EF: 63 %
LVEF MODALITY: NORMAL
MCH RBC QN AUTO: 31.6 PG (ref 26–34)
MCHC RBC AUTO-ENTMCNC: 33.3 G/DL (ref 31–36)
MCV RBC AUTO: 94.8 FL (ref 80–100)
PDW BLD-RTO: 12.7 % (ref 12.4–15.4)
PLATELET # BLD: 259 K/UL (ref 135–450)
PMV BLD AUTO: 7.5 FL (ref 5–10.5)
POTASSIUM REFLEX MAGNESIUM: 4.6 MMOL/L (ref 3.5–5.1)
PROTHROMBIN TIME: 13.6 SEC (ref 11.7–14.5)
RBC # BLD: 4.37 M/UL (ref 4.2–5.9)
SODIUM BLD-SCNC: 139 MMOL/L (ref 136–145)
TRIGL SERPL-MCNC: 215 MG/DL (ref 0–150)
TROPONIN: <0.01 NG/ML
VLDLC SERPL CALC-MCNC: 43 MG/DL
WBC # BLD: 6.1 K/UL (ref 4–11)

## 2022-07-28 PROCEDURE — 93306 TTE W/DOPPLER COMPLETE: CPT

## 2022-07-28 PROCEDURE — G0378 HOSPITAL OBSERVATION PER HR: HCPCS

## 2022-07-28 PROCEDURE — 93458 L HRT ARTERY/VENTRICLE ANGIO: CPT

## 2022-07-28 PROCEDURE — C1894 INTRO/SHEATH, NON-LASER: HCPCS

## 2022-07-28 PROCEDURE — 93010 ELECTROCARDIOGRAM REPORT: CPT | Performed by: INTERNAL MEDICINE

## 2022-07-28 PROCEDURE — 36415 COLL VENOUS BLD VENIPUNCTURE: CPT

## 2022-07-28 PROCEDURE — 99152 MOD SED SAME PHYS/QHP 5/>YRS: CPT | Performed by: INTERNAL MEDICINE

## 2022-07-28 PROCEDURE — C1769 GUIDE WIRE: HCPCS

## 2022-07-28 PROCEDURE — 6360000002 HC RX W HCPCS

## 2022-07-28 PROCEDURE — 93005 ELECTROCARDIOGRAM TRACING: CPT | Performed by: INTERNAL MEDICINE

## 2022-07-28 PROCEDURE — 2709999900 HC NON-CHARGEABLE SUPPLY

## 2022-07-28 PROCEDURE — 80061 LIPID PANEL: CPT

## 2022-07-28 PROCEDURE — 6370000000 HC RX 637 (ALT 250 FOR IP)

## 2022-07-28 PROCEDURE — 85027 COMPLETE CBC AUTOMATED: CPT

## 2022-07-28 PROCEDURE — 80048 BASIC METABOLIC PNL TOTAL CA: CPT

## 2022-07-28 PROCEDURE — 6360000004 HC RX CONTRAST MEDICATION: Performed by: INTERNAL MEDICINE

## 2022-07-28 PROCEDURE — 2500000003 HC RX 250 WO HCPCS

## 2022-07-28 PROCEDURE — 93458 L HRT ARTERY/VENTRICLE ANGIO: CPT | Performed by: INTERNAL MEDICINE

## 2022-07-28 PROCEDURE — 99152 MOD SED SAME PHYS/QHP 5/>YRS: CPT

## 2022-07-28 PROCEDURE — 85610 PROTHROMBIN TIME: CPT

## 2022-07-28 PROCEDURE — 99153 MOD SED SAME PHYS/QHP EA: CPT

## 2022-07-28 RX ORDER — SODIUM CHLORIDE 0.9 % (FLUSH) 0.9 %
5-40 SYRINGE (ML) INJECTION PRN
Status: DISCONTINUED | OUTPATIENT
Start: 2022-07-28 | End: 2022-07-28 | Stop reason: HOSPADM

## 2022-07-28 RX ORDER — ACETAMINOPHEN 325 MG/1
650 TABLET ORAL EVERY 4 HOURS PRN
Status: DISCONTINUED | OUTPATIENT
Start: 2022-07-28 | End: 2022-07-28 | Stop reason: HOSPADM

## 2022-07-28 RX ORDER — NITROGLYCERIN 0.4 MG/1
TABLET SUBLINGUAL
Qty: 25 TABLET | Refills: 3 | Status: SHIPPED | OUTPATIENT
Start: 2022-07-28

## 2022-07-28 RX ORDER — SODIUM CHLORIDE 0.9 % (FLUSH) 0.9 %
5-40 SYRINGE (ML) INJECTION EVERY 12 HOURS SCHEDULED
Status: DISCONTINUED | OUTPATIENT
Start: 2022-07-28 | End: 2022-07-28 | Stop reason: HOSPADM

## 2022-07-28 RX ORDER — ASPIRIN 81 MG/1
81 TABLET, CHEWABLE ORAL DAILY
Qty: 30 TABLET | Refills: 3 | Status: SHIPPED | OUTPATIENT
Start: 2022-07-29

## 2022-07-28 RX ORDER — SODIUM CHLORIDE 9 MG/ML
INJECTION, SOLUTION INTRAVENOUS ONCE
Status: DISCONTINUED | OUTPATIENT
Start: 2022-07-28 | End: 2022-07-28 | Stop reason: HOSPADM

## 2022-07-28 RX ORDER — ATORVASTATIN CALCIUM 40 MG/1
40 TABLET, FILM COATED ORAL NIGHTLY
Qty: 30 TABLET | Refills: 3 | Status: SHIPPED | OUTPATIENT
Start: 2022-07-28

## 2022-07-28 RX ADMIN — IOHEXOL 100 ML: 350 INJECTION, SOLUTION INTRAVENOUS at 11:29

## 2022-07-28 NOTE — DISCHARGE SUMMARY
Hospital Discharge Summary    Patient's PCP: No primary care provider on file. Admit Date: 7/27/2022   Discharge Date: 7/28/2022    Admitting Physician: Dr. Brittany Cotton MD  Discharge Physician: Dr. Brittany Cotton MD   Consults: cardiology    HPI:  61 y.o. male with HTN, Tobacco Use (1PPD x 40 yrs), Cocaine abuse who presented to the ED with Chest pain. 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 He denies associated diaphoresis, numbness or tingling, nausea or vomiting, abdominal pain. He has not had  leg swelling or pain. He denies any history of CAD. Does report a family history of CAD in mother, who had a stent about his age. Ischemia eval in 2017: negative, no reversible ischemia. EF 74%. He called EMS with persistent symptoms, and was given aspirin and nitro en route per EMS, with improvement of his symptoms. In the ED, he was afebrile. SBP was in the 140s. Labs showed Cr 1.1, Na 132, and other labs were fairly normal. Troponin < 0.01. CXR showed nil acute, \"Suspected bibasilar atelectasis\". EKG showed TWI in the inferolateral leads which appear new c/f EKG from 2019. Brief hospital course:  Given the concern of the patients presentation and the concern of the possible multi-factorial etiology contributing to patients symptomatology. Patient was admitted and evaluated and found to have:      Discharge Diagnoses:        Chest pain  EKG changes  Essential HTN  Cocaine abuse  Tobacco abuse        Presented with chest pain, with dyspnea. Possibly sec to cocaine abuse, but has significant risk factors for CAD, and new TWI in the inferolateral leads     Iruled out for acute MI     Admitted as needs updated ischemia evaluation (last one was 5 yrs ago: neg stress test).       S/p Angiogram:    Left ventricular end diastolic pressure was 8 mmHg. There was no gradient across the aortic valve upon pullback. The left main coronary artery arises from the left coronary cusp giving rise to the left anterior descending artery and the left circumflex artery. The left main reveals no angiographically significant stenosis. The left anterior descending artery arises in normal fashion from left coronary cusp giving rise to diagonals and septal branches. The LAD reveals up to 30% ostial/proximal stenosis. First diagonal 50-60% ostial stenosis. The left circumflex  and its obtuse marginal branches reveal mild to moderate diffuse luminal irregularities. The right coronary artery is a right dominant vessel. Minimal luminal irregularities. Summary:   Nonobstructive CAD  Normal LVEDP        FLP and A1C: pending: F/u with PCP  Tox screen: Cocaine    Appears likely chest pain from cocaine and non obstructive CAD         Aggressive risk factor modification  ASA and statin  HTN mgt    Counseling re tobacco and cocaine/drug use. F/u with PCP in 1 week            Physical Exam: /67   Pulse 64   Temp 97.8 °F (36.6 °C) (Oral)   Resp 16   Ht 6' 2\" (1.88 m)   Wt 229 lb 1.6 oz (103.9 kg)   SpO2 98%   BMI 29.41 kg/m²     No results for input(s): POCGLU in the last 72 hours.     General appearance: alert, appears stated age, and cooperative  Head: Normocephalic, without obvious abnormality, atraumatic  Neck: no adenopathy, no carotid bruit, no JVD, supple, symmetrical, trachea midline, and thyroid not enlarged, symmetric, no tenderness/mass/nodules  Lungs: clear to auscultation bilaterally  Chest wall: no tenderness  Heart: regular rate and rhythm, S1, S2 normal, no murmur, click, rub or gallop  Abdomen: soft, non-tender; bowel sounds normal; no masses,  no organomegaly  Extremities: extremities normal, atraumatic, no cyanosis or edema  Pulses: 2+ and symmetric  Skin: Skin color, texture, turgor normal. No rashes or lesions  Neurologic: Grossly normal        LABS:  Recent Labs     07/28/22  0806   WBC 6.1   HGB 13.8         Recent Labs     07/28/22  0806      K 4.6      CO2 20*   BUN 16   CREATININE 1.0   GLUCOSE 98     Recent Labs     07/28/22  0907   INR 1.05           Discharge Medications:  Current Discharge Medication List             Details   aspirin 81 MG chewable tablet Take 1 tablet by mouth in the morning. Qty: 30 tablet, Refills: 3      nitroGLYCERIN (NITROSTAT) 0.4 MG SL tablet up to max of 3 total doses. If no relief after 1 dose, call 911. Qty: 25 tablet, Refills: 3      atorvastatin (LIPITOR) 40 MG tablet Take 1 tablet by mouth nightly  Qty: 30 tablet, Refills: 3                Details   amLODIPine (NORVASC) 10 MG tablet Take 10 mg by mouth in the morning. tamsulosin (FLOMAX) 0.4 MG capsule Take 1 capsule by mouth daily  Qty: 30 capsule, Refills: 0           Activity: activity as tolerated  Diet: cardiac diet  Wound Care: as directed    Disposition: home  Discharged Condition: Stable  Follow Up: Primary Care Physician in one week    Total time spent on discharge, finalizing medications, referrals and arranging outpatient follow up was more than 30 minutes    Thank you Dr. Soto primary care provider on file. for the opportunity to be involved in this patients care. If you have any questions or concerns please feel free to contact me at [phone].

## 2022-07-28 NOTE — CONSULTS
Cardiology Consultation/History and Physical                                                                  Pt Name: Sherri Johnson  Age: 61 y.o. Sex: male  : 1965  Location: 6327/6327-01    Referring Physician: Carlos Malcolm MD        Reason for Consult:     Reason for Consultation/Chief Complaint: chest pain     HPI:      Sherri Johnson is a 61 y.o. male with a past medical history of hypertension, tobacco and recent cocaine use who presents with chest pain. Reports episode of midsternal chest pain while smoking at work. Fairly intense sharp pain, lasted up to an hour. Resolved after receiving NG. Not very active at baseline. EKG in the ED notable for anterolateral and inferior t wave inversions concerning for ischemia    Does report family history of CAD, mother     May need prostate biopsy      Histories     Past Medical History:   has a past medical history of Drug abuse ([address] Headache, History of gout, Hypertension, and Normal cardiac stress test.    Surgical History:   has a past surgical history that includes Cruciate/collateral ligament repair and Wrist surgery. Social History:   reports that he has been smoking cigarettes. He has a 30.00 pack-year smoking history. He has never used smokeless tobacco. He reports current alcohol use. He reports current drug use. Drugs: Marijuana (Weed) and Cocaine. Family History:  No evidence for sudden cardiac death or premature CAD      Medications:       Home Medications  Were reviewed and are listed in nursing record. and/or listed below  Prior to Admission medications    Medication Sig Start Date End Date Taking?  Authorizing Provider   amLODIPine (NORVASC) 10 MG tablet Take 10 mg by mouth in the morning. 22   Historical Provider, MD   tamsulosin (FLOMAX) 0.4 MG capsule Take 1 capsule by mouth daily 2/15/18   Tory Gotti MD          Inpatient Medications:   tamsulosin  0.4 mg Oral Daily    sodium chloride flush  5-40 mL IntraVENous 2 times per day    [START ON 7/28/2022] aspirin  81 mg Oral Daily    atorvastatin  40 mg Oral Nightly    amLODIPine  10 mg Oral Daily       IV drips:   sodium chloride         PRN:  nitroGLYCERIN, sodium chloride flush, sodium chloride, ondansetron **OR** ondansetron, acetaminophen **OR** acetaminophen, polyethylene glycol    Allergy:     Patient has no known allergies. Review of Systems:     All 12 point review of symptoms completed. Pertinent positives identified in the HPI, all other review of symptoms negative as below. CONSTITUTIONAL: No fatigue  SKIN: No rash or pruritis. EYES: No visual changes or diplopia. No scleral icterus. ENT: No Headaches, hearing loss or vertigo. No mouth sores or sore throat. CARDIOVASCULAR: see HPI. RESPIRATORY: No dyspnea. No cough or wheezing, no sputum production. GASTROINTESTINAL: No N/V/D. No abdominal pain, appetite loss, blood in stools. GENITOURINARY: No dysuria, trouble voiding, or hematuria. MUSCULOSKELETAL:  No gait disturbance, weakness or joint complaints. NEUROLOGICAL: No headache, diplopia, change in muscle strength, numbness or tingling. No change in gait, balance, coordination, mood, affect, memory, mentation, behavior. ENDOCRINE: No excessive thirst, fluid intake, or urination. No tremor. HEMATOLOGIC: No abnormal bruising or bleeding. ALLERGY: No nasal congestion or hives.       Physical Examination:     Vitals:    07/27/22 1300 07/27/22 1502 07/27/22 1800 07/27/22 1936   BP: (!) 143/91 (!) 136/93 (!) 144/89 (!) 153/92   Pulse: 72 76 65 75   Resp: 16 19 15 18   Temp:  97.6 °F (36.4 °C) 98 °F (36.7 °C) 97.5 °F (36.4 °C)   TempSrc:  Oral Oral Oral   SpO2: 100% 97% 99% 99%   Weight:       Height:           Wt Readings from Last 3 Encounters:   07/27/22 233 lb 4 oz (105.8 kg)   02/28/19 215 lb (97.5 kg)   06/16/17 209 lb 7 oz (95 kg)         General Appearance:  Alert, cooperative, no distress, appears stated age Appropriate weight   Head: vasospasm  Tobacco use  HTN  Recent cocaine use    No further chest pain  Given new ECG changes, plan for invasive ischemic assessment  LHC, possible PCI  Risks, benefits and alternatives including heart attack, stroke, kidney failure were discussed with the patient    Asa, Lovenox, statin  Will hold beta-blocker for possible vasospastic component    I have personally reviewed the reports and images of labs, radiological studies, cardiac studies including ECG's and current and old medical records. The note was completed using EMR and Dragon dictation system. Every effort was made to ensure accuracy; however, inadvertent computerized transcription errors may be present. All questions and concerns were addressed to the patient. Alternatives to my treatment were discussed. I would like to thank you for providing me the opportunity to participate in the care of your patient. If you have any questions, please do not hesitate to contact me.      Kathrin McphersonSarah Ville [id number] [facility]  [address] [address]  Ph: [phone]  Fax: [phone]

## 2022-07-28 NOTE — PROGRESS NOTES
4 Eyes Admission Assessment     I agree as the admission nurse that 2 RN's have performed a thorough Head to Toe Skin Assessment on the patient. ALL assessment sites listed below have been assessed on admission. Areas assessed by both nurses:   [x]   Head, Face, and Ears   [x]   Shoulders, Back, and Chest  [x]   Arms, Elbows, and Hands   []   Coccyx, Sacrum, and Ischium  [x]   Legs, Feet, and Heels        Does the Patient have Skin Breakdown?   No         Rony Prevention initiated:  No   Wound Care Orders initiated:  No      [facility] nurse consulted for Pressure Injury (Stage 3,4, Unstageable, DTI, NWPT, and Complex wounds) or Rony score 18 or lower:  No      Nurse 1 eSignature: Electronically signed by Elton Oleary RN on 7/27/22 at 10:36 PM EDT    **SHARE this note so that the co-signing nurse is able to place an eSignature**    Nurse 2 eSignature: {Esignature:[id number]}

## 2022-07-28 NOTE — PLAN OF CARE
Problem: Discharge Planning  Goal: Discharge to home or other facility with appropriate resources  Outcome: Progressing  Flowsheets (Taken 7/27/2022 2133 by Jesus Gomes RN)  Discharge to home or other facility with appropriate resources:   Identify barriers to discharge with patient and caregiver   Identify discharge learning needs (meds, wound care, etc)     Problem: Pain  Goal: Verbalizes/displays adequate comfort level or baseline comfort level  Outcome: Progressing  Flowsheets (Taken 7/27/2022 2341)  Verbalizes/displays adequate comfort level or baseline comfort level:   Assess pain using appropriate pain scale   Encourage patient to monitor pain and request assistance   Administer analgesics based on type and severity of pain and evaluate response   Implement non-pharmacological measures as appropriate and evaluate response     Problem: Cardiovascular - Adult  Goal: Maintains optimal cardiac output and hemodynamic stability  Outcome: Progressing  Flowsheets (Taken 7/27/2022 2341)  Maintains optimal cardiac output and hemodynamic stability:   Monitor blood pressure and heart rate   Assess for signs of decreased cardiac output

## 2022-07-28 NOTE — PROCEDURES
CARDIAC CATHETERIZATION      Hailey Lui (89 y.o., male)  1965  [id number]    7/28/2022    INDICATION(s):  Chest pain with EKG changes, possible ACS      PROCEDURE:    Left heart cath  Coronary angiography      Risk, benefits alternatives to cardiac catheterization and possible intervention were discussed with the patient. Informed consent was obtained. The patient was brought to the cath lab and was prepped and draped in the normal sterile technique. 1% Lidocaine was used to anesthetize the site. The right radial was cannulated and a 6 Fr sheath was inserted under ultrasonographic guidance. Continuous pulse-oximetry and ECG monitoring was performed, and frequent blood pressure assessment was performed. An independent trained observer pushed medications at my direction. We monitored the patient's level of consciousness and vital signs/physiologic status throughout the procedure (see start and stop times below). All catheter were advanced through the sheath over a guide wire and advanced under fluoroscopic guidance into the ascending aorta. We used JR4/JR3.5 catheters for right and left coronary angiography and to cross the aortic valve. Left ventricular pressure and pullback across aortic valve was recorded. The sheath was removed and hemostasis was obtained with a TR band. The patient was moved to the floor in stable condition. There were no complications. Sedation: 100 mg Versed, 2 mcg Fentanyl  Sedation start: 1050  Sedation stop: 1120      Estimated blood loss: <10 mL      HEMODYNAMIC / ANGIOGRAPHIC DATA:    Left ventricular end diastolic pressure was 8 mmHg. There was no gradient across the aortic valve upon pullback. The left main coronary artery arises from the left coronary cusp giving rise to the left anterior descending artery and the left circumflex artery. The left main reveals no angiographically significant stenosis.   The left anterior descending artery arises in normal fashion from left coronary cusp giving rise to diagonals and septal branches. The LAD reveals up to 30% ostial/proximal stenosis. First diagonal 50-60% ostial stenosis. The left circumflex  and its obtuse marginal branches reveal mild to moderate diffuse luminal irregularities. The right coronary artery is a right dominant vessel. Minimal luminal irregularities. CONCLUSIONS:  Nonobstructive CAD  2.    Normal LVEDP    RECOMMENDATIONS:    Routine post cardiac catheterization care  Aggressive risk factor modification  Tobacco cessation        Electronically signed by Nemo Marroquin MD on 7/28/2022 at 11:42 AM

## 2024-05-14 ENCOUNTER — APPOINTMENT (OUTPATIENT)
Dept: GENERAL RADIOLOGY | Age: 59
End: 2024-05-14
Payer: COMMERCIAL

## 2024-05-14 ENCOUNTER — HOSPITAL ENCOUNTER (EMERGENCY)
Age: 59
Discharge: HOME | End: 2024-05-14
Attending: EMERGENCY MEDICINE
Payer: COMMERCIAL

## 2024-05-14 VITALS
OXYGEN SATURATION: 99 % | HEART RATE: 79 BPM | HEIGHT: 74 IN | SYSTOLIC BLOOD PRESSURE: 177 MMHG | DIASTOLIC BLOOD PRESSURE: 112 MMHG | WEIGHT: 210 LBS | BODY MASS INDEX: 26.95 KG/M2 | TEMPERATURE: 98.6 F | RESPIRATION RATE: 16 BRPM

## 2024-05-14 DIAGNOSIS — S61.211A LACERATION OF LEFT INDEX FINGER WITHOUT FOREIGN BODY WITHOUT DAMAGE TO NAIL, INITIAL ENCOUNTER: Primary | ICD-10-CM

## 2024-05-14 PROCEDURE — 90715 TDAP VACCINE 7 YRS/> IM: CPT | Performed by: EMERGENCY MEDICINE

## 2024-05-14 PROCEDURE — 73130 X-RAY EXAM OF HAND: CPT

## 2024-05-14 PROCEDURE — 6370000000 HC RX 637 (ALT 250 FOR IP): Performed by: EMERGENCY MEDICINE

## 2024-05-14 PROCEDURE — 90471 IMMUNIZATION ADMIN: CPT | Performed by: EMERGENCY MEDICINE

## 2024-05-14 PROCEDURE — 99284 EMERGENCY DEPT VISIT MOD MDM: CPT

## 2024-05-14 PROCEDURE — 12002 RPR S/N/AX/GEN/TRNK2.6-7.5CM: CPT

## 2024-05-14 PROCEDURE — 6360000002 HC RX W HCPCS: Performed by: EMERGENCY MEDICINE

## 2024-05-14 RX ORDER — LIDOCAINE HYDROCHLORIDE AND EPINEPHRINE 10; 10 MG/ML; UG/ML
20 INJECTION, SOLUTION INFILTRATION; PERINEURAL ONCE
Status: DISCONTINUED | OUTPATIENT
Start: 2024-05-14 | End: 2024-05-14 | Stop reason: HOSPADM

## 2024-05-14 RX ORDER — ACETAMINOPHEN 500 MG
500 TABLET ORAL 4 TIMES DAILY PRN
Qty: 120 TABLET | Refills: 0 | Status: SHIPPED | OUTPATIENT
Start: 2024-05-14

## 2024-05-14 RX ORDER — CEPHALEXIN 500 MG/1
500 CAPSULE ORAL 4 TIMES DAILY
Qty: 28 CAPSULE | Refills: 0 | Status: SHIPPED | OUTPATIENT
Start: 2024-05-14 | End: 2024-05-21

## 2024-05-14 RX ORDER — BACITRACIN ZINC AND POLYMYXIN B SULFATE 500; 1000 [USP'U]/G; [USP'U]/G
OINTMENT TOPICAL ONCE
Status: COMPLETED | OUTPATIENT
Start: 2024-05-14 | End: 2024-05-14

## 2024-05-14 RX ADMIN — Medication 1 G: at 19:51

## 2024-05-14 RX ADMIN — TETANUS TOXOID, REDUCED DIPHTHERIA TOXOID AND ACELLULAR PERTUSSIS VACCINE, ADSORBED 0.5 ML: 5; 2.5; 8; 8; 2.5 SUSPENSION INTRAMUSCULAR at 19:47

## 2024-05-14 ASSESSMENT — PAIN - FUNCTIONAL ASSESSMENT: PAIN_FUNCTIONAL_ASSESSMENT: NONE - DENIES PAIN

## 2024-05-14 NOTE — DISCHARGE INSTRUCTIONS
Please make sure that you take the antibiotics as prescribed .keep the wound covered and dry for the first 24 hours.  After 24 hours, you may remove the dressing and wash gently with soap and water, but do not immerse the wound in water for 3 days.  Wash the wound gently and then pat dry twice per day, and apply a thin layer of ointment afterwards.  You should have your sutures removed in  7 days - call your doctor today to make an appointment for suture removal, or return to the ER if needed.  Call your doctor or return to the ER for increasing redness, swelling, drainage of pus, fever, vomiting, or other concerns

## 2024-05-14 NOTE — ED PROVIDER NOTES
[facility]  EMERGENCY DEPARTMENT ENCOUNTER          ATTENDING PHYSICIAN NOTE       Date of evaluation: 5/14/2024    Chief Complaint     Hand Injury (Pt was at work and attempting to cut a chain link with  wheel and slipped and hit L hand. 2 in lac near thumb on hand. )      History of Present Illness     Javier Pelletier is a 59 y.o. male who presents today with left hand laceration.  Was working, is right-handed and was using a cutting saw at work and lacerated the radial side of his left index finger.      ASSESSMENT / PLAN  (MEDICAL DECISION MAKING)     INITIAL VITALS: BP: (!) 141/105, Temp: 98.6 °F (37 °C), Pulse: 79, Respirations: 16, SpO2: 93 %      Javier Pelletier is a 59 y.o. male with radial side of left index finger laceration.  Denies any other issues no arterial bleeding per report.  On presentation laceration did not appear to extend to extensor or flexor tendon.  No obvious joint involvement.  X-rays showed no abnormality, and no obvious foreign body seen on clinical examination.  Laceration repaired as below.  7 Ethilon sutures placed.  Will give antibiotics to Keflex given wound location.      Medical Decision Making  Amount and/or Complexity of Data Reviewed  Radiology: ordered.    Risk  OTC drugs.  Prescription drug management.        Is this patient to be included in the SEP-1 core measure? No Exclusion criteria - the patient is NOT to be included for SEP-1 Core Measure due to: Infection is not suspected    Clinical Impression     No diagnosis found.    Disposition     PATIENT REFERRED TO:  No follow-up provider specified.    DISCHARGE MEDICATIONS:  New Prescriptions    No medications on file       DISPOSITION          Diagnostic Results and Other Data       RADIOLOGY:  XR HAND LEFT (MIN 3 VIEWS)   Final Result   Impression:    No acute osseous injury.      Electronically signed by Jatin Lucas MD          LABS:   No results found for this visit on 05/14/24.    EKG       ED BEDSIDE

## 2024-05-15 NOTE — DISCHARGE INSTR - COC
Continuity of Care Form    Patient Name: Javier Pelletier   :  1965  MRN:  [id number]    Admit date:  2024  Discharge date:  ***    Code Status Order: Prior   Advance Directives:     Admitting Physician:  No admitting provider for patient encounter.  PCP: No primary care provider on file.    Discharging Nurse: ***  Discharging Hospital Unit/Room#: A04/A04-04  Discharging Unit Phone Number: ***    Emergency Contact:   Extended Emergency Contact Information  Primary Emergency Contact: Amina Pelletier  Address: [address] [place] [address]            [place] [address]  Home Phone: [phone]  Relation: Parent  Secondary Emergency Contact: Christiana Pelletier  Mobile Phone: [phone]  Relation: Brother/Sister    Past Surgical History:  Past Surgical History:   Procedure Laterality Date    CRUCIATE AND COLLATERAL LIGAMENT REPAIR      WRIST SURGERY         Immunization History:   Immunization History   Administered Date(s) Administered    Influenza Virus Vaccine 2015    Influenza, FLUARIX, FLULAVAL, FLUZONE (age 6 mo+) AND AFLURIA, (age 3 y+), PF, 0.5mL 2017    Pneumococcal, PPSV23, PNEUMOVAX 23, (age 2y+), SC/IM, 0.5mL 2015    TDaP, ADACEL (age 10y-64y), BOOSTRIX (age 10y+), IM, 0.5mL 2024       Active Problems:  Patient Active Problem List   Diagnosis Code    Chest pain R07.9    Headache R51.9    Hyperkeratosis of skin L85.9    Pes planus of both feet M21.41, M21.42    Xerosis of skin L85.3       Isolation/Infection:   Isolation            No Isolation          Patient Infection Status       None to display            Nurse Assessment:  Last Vital Signs: BP (!) 177/112   Pulse 79   Temp 98.6 °F (37 °C) (Oral)   Resp 16   Ht 1.88 m (6' 2\")   Wt 95.3 kg (210 lb)   SpO2 99%   BMI 26.96 kg/m²     Last documented pain score (0-10 scale):    Last Weight:   Wt Readings from Last 1 Encounters:   24 95.3 kg (210 lb)     Mental Status:  {IP PT MENTAL STATUS:}    IV Access:  { NOBLE IV